# Patient Record
(demographics unavailable — no encounter records)

---

## 2024-10-29 NOTE — HISTORY OF PRESENT ILLNESS
[FreeTextEntry1] : Patient is here for follow up treatment for type 1 diabetes. Her prior laboratory tests show her A1c to be 7.2 was 6.6. Now 7.1..  Says she has been snacking a lot.  Is also concerned about weight gain. She is on Tandem pump with Dexcom sensor. and Control IQ which she is happy with. .  Last few mos. has been on multiple ABs for UTI S and has kidney stones. She also feels her menstrual cycle is off and gets it every 2 months.  Menses can be heavy.  Recently saw her ophthalmologist and was told everything was okay. [Continuous Glucose Monitoring] : Continuous Glucose Monitoring: Yes [Dexcom] : Dexcom [FreeTextEntry2] : 72 [FreeTextEntry3] : 24+3.3 [FreeTextEntry4] : 0.4 [de-identified] : 7.2 [FreeTextEntry5] : 161 [FreeTextEntry6] : 26

## 2024-10-29 NOTE — ASSESSMENT
[Diabetes Foot Care] : diabetes foot care [Long Term Vascular Complications] : long term vascular complications of diabetes [Carbohydrate Consistent Diet] : carbohydrate consistent diet [Importance of Diet and Exercise] : importance of diet and exercise to improve glycemic control, achieve weight loss and improve cardiovascular health [Hypoglycemia Management] : hypoglycemia management [Glucagon Use] : glucagon use [Action and use of Insulin] : action and use of short and long-acting insulin [Self Monitoring of Blood Glucose] : self monitoring of blood glucose [Retinopathy Screening] : Patient was referred to ophthalmology for retinopathy screening [Diabetic Medications] : Risks and benefits of diabetic medications were discussed [FreeTextEntry1] : Patient was doing well on her Tandem pump and Dexcom sensor with Control IQ. Had been much improved since had been eating less carbs. Last visit we did adjust her basal setting by lowering and change carb ratio to 8 throughout the day from 10 since having PP spikes. Most likely also underestimating CARBs will focus on this..  Last visit we did change CF from  30 to 20.   Knows to use activity mode at least 1 hour before exercise and has sleep mod which she loves. Probably increase estimate of carbs. Vit D better. Cholesterol has increased not sure why. may miss some doses. Consider see a cardiologist.  Since cholesterol elevated we will start Crestor 5 mg daily.. Started Ramipril 2..5 mg for higher BPs. Will take iron during week of menses for mild anemia.  f/u 3 months  Since concerned about weight gain we will do a trial of Mounjaro 2.5 mg weekly and will follow-up with the CDE in 1 month.  Will see if we need to adjust settings.

## 2024-10-29 NOTE — PHYSICAL EXAM
[Alert] : alert [Obese] : obese [No Acute Distress] : no acute distress [Normal Voice/Communication] : normal voice communication [Normal Sclera/Conjunctiva] : normal sclera/conjunctiva [Normal Outer Ear/Nose] : the ears and nose were normal in appearance [Thyroid Not Enlarged] : the thyroid was not enlarged [No Thyroid Nodules] : no palpable thyroid nodules [No Respiratory Distress] : no respiratory distress [Clear to Auscultation] : lungs were clear to auscultation bilaterally [Normal PMI] : the apical impulse was normal [Normal Rate] : heart rate was normal [Normal Bowel Sounds] : normal bowel sounds [Soft] : abdomen soft [No CVA Tenderness] : no ~M costovertebral angle tenderness [No Spinal Tenderness] : no spinal tenderness [Normal Gait] : normal gait [No Joint Swelling] : no joint swelling seen [No Rash] : no rash [Cranial Nerves Intact] : cranial nerves 2-12 were intact [Normal Reflexes] : deep tendon reflexes were 2+ and symmetric [No Tremors] : no tremors [Oriented x3] : oriented to person, place, and time [Normal Insight/Judgement] : insight and judgment were intact

## 2024-12-12 NOTE — HISTORY OF PRESENT ILLNESS
[FreeTextEntry1] : 47 year old female with Bilateral kidney stones here today after RIRS for kidney stones. Patient with bilateral duplicate collection system. The patient recently removed 3 DJ stents post/op. Referred to ER for flank pain. Residual stones in the upper kidney denisa bilaterally. On last check, mild hydro and regular creat. Comes to office for check.  Tobacco use: never smoker  Last Creatinine: 0.78 mg/dL (10/26/2024)  Last UCx: negative (11/03/2023) Uro meds: none  11/03/2023 - On US kidney the left kidney demonstrates mild hydronephrosis with a 10 mm echogenic focus in the lower pole. There is a 5.7 mm echogenic focus in the upper pole of the right kidney with distal shadowing. Both kidneys are normal in size and echogenicity without solid masses visualized  Blood draw for renal panel Collecting urine for UA and Culture  Will F/U in case of positive results.  07/04/2023 - CT Urogram IMPRESSION: 1. Bilateral renal collecting system duplication. Mildly obstructing stone in the renal pelvis of the upper pole moiety of the left kidney. Additional bilateral renal stones, as described. No evidence of ureteral or bladder stone. 2. Enhancing 8 mm gallbladder polyp. In the absence of prophylactic cholecystectomy, follow-up ultrasound in 6 months is recommended.  10/18/2023 - CT Renal Stone Hunt KIDNEYS/URETERS: Several nonobstructing right renal calculi in the upper pole and lower pole moieties again demonstrated without significant change measuring up to 3 mm. No right hydronephrosis. 10 x 5 mm stone in the renal pelvis of the left renal upper pole moiety with mild hydronephrosis without significant change. Small group of 2-3 mm stones within the left renal upper pole moiety is no longer present. The remaining multiple additional left renal stones are without significant change  11/03/2023 - Pathology Final Diagnosis 1.  Calculus, left kidney; removal: -   Fragments of calculus, gross examination -   Chemical analysis: 100% Calcium oxalate monohydrate. 2.  Calculus, right kidney; removal: -   Fragments of calculus, gross examination -   Chemical analysis: 90% Calcium oxalate monohydrate.  10% Calcium phosphate (apatite)

## 2024-12-12 NOTE — ASSESSMENT
[FreeTextEntry1] : 47 year old female with Bilateral kidney stones here today after RIRS for kidney stones. Patient with bilateral duplicate collection system. The patient recently removed 3 DJ stents post/op. Referred to ER for flank pain. Residual stones in the upper kidney denisa bilaterally. On last check, mild hydro and regular creat. Comes to office for check.  Tobacco use: never smoker  Last Creatinine: 0.78 mg/dL (10/26/2024)  Last UCx: negative (11/03/2023) Uro meds: none  11/03/2023 - On US kidney the left kidney demonstrates mild hydronephrosis with a 10 mm echogenic focus in the lower pole. There is a 5.7 mm echogenic focus in the upper pole of the right kidney with distal shadowing. Both kidneys are normal in size and echogenicity without solid masses visualized  Blood draw for renal panel Collecting urine for UA and Culture  Will F/U in case of positive results.  07/04/2023 - CT Urogram IMPRESSION: 1. Bilateral renal collecting system duplication. Mildly obstructing stone in the renal pelvis of the upper pole moiety of the left kidney. Additional bilateral renal stones, as described. No evidence of ureteral or bladder stone. 2. Enhancing 8 mm gallbladder polyp. In the absence of prophylactic cholecystectomy, follow-up ultrasound in 6 months is recommended.  10/18/2023 - CT Renal Stone Hunt KIDNEYS/URETERS: Several nonobstructing right renal calculi in the upper pole and lower pole moieties again demonstrated without significant change measuring up to 3 mm. No right hydronephrosis. 10 x 5 mm stone in the renal pelvis of the left renal upper pole moiety with mild hydronephrosis without significant change. Small group of 2-3 mm stones within the left renal upper pole moiety is no longer present. The remaining multiple additional left renal stones are without significant change  11/03/2023 - Pathology Final Diagnosis 1.  Calculus, left kidney; removal: -   Fragments of calculus, gross examination -   Chemical analysis: 100% Calcium oxalate monohydrate. 2.  Calculus, right kidney; removal: -   Fragments of calculus, gross examination -   Chemical analysis: 90% Calcium oxalate monohydrate.  10% Calcium phosphate (apatite)  Ordering new CT scan to check for stones. Planning UA and Culture (JACINTO on ABX) Blood draw for renal panel. Will F/U in case of positive results

## 2025-01-07 NOTE — ASSESSMENT
[FreeTextEntry1] : 47 year old female with Bilateral kidney stones here today after RIRS for kidney stones.  Patient with bilateral duplicate collection system. The patient recently removed 3 DJ stents post/op.  On last check, mild hydro and regular creat. Comes to office for planning based on last CT scan  Tobacco use: never smoker  Last Creatinine: 0.75 mg/dL (12/13/2024) Last UCx: negative  Uro meds: none  11/03/2023 - On US kidney the left kidney demonstrates mild hydronephrosis with a 10 mm echogenic focus in the lower pole. There is a 5.7 mm echogenic focus in the upper pole of the right kidney with distal shadowing. Both kidneys are normal in size and echogenicity without solid masses visualized  Blood draw for renal panel Collecting urine for UA and Culture  Will F/U in case of positive results.  07/04/2023 - CT Urogram IMPRESSION: 1. Bilateral renal collecting system duplication. Mildly obstructing stone in the renal pelvis of the upper pole moiety of the left kidney. Additional bilateral renal stones, as described. No evidence of ureteral or bladder stone. 2. Enhancing 8 mm gallbladder polyp. In the absence of prophylactic cholecystectomy, follow-up ultrasound in 6 months is recommended.  10/18/2023 - CT Renal Stone Hunt KIDNEYS/URETERS: Several nonobstructing right renal calculi in the upper pole and lower pole moieties again demonstrated without significant change measuring up to 3 mm. No right hydronephrosis. 10 x 5 mm stone in the renal pelvis of the left renal upper pole moiety with mild hydronephrosis without significant change. Small group of 2-3 mm stones within the left renal upper pole moiety is no longer present. The remaining multiple additional left renal stones are without significant change  11/03/2023 - Pathology Final Diagnosis 1.  Calculus, left kidney; removal: -   Fragments of calculus, gross examination -   Chemical analysis: 100% Calcium oxalate monohydrate. 2.  Calculus, right kidney; removal: -   Fragments of calculus, gross examination -   Chemical analysis: 90% Calcium oxalate monohydrate.  10% Calcium phosphate (apatite)  12/26/2024 - CT scan IMPRESSION: Mild hydroureteronephrosis of the left upper pole moiety to the level of a 8 mm mid ureteral stone. Additional sub-3 mm nonobstructing left lower pole renal stones.  Possible different treatments are explained, including SWL and URS. The patient agrees with the proposed left URS treatment, explanation provided with video summary. Possible complications are explained including bleeding, ureteral strictures. During the procedure right ureteroscopy will be also performed.  Patient booked for surgery  Collecting urine for UA and Culture

## 2025-01-07 NOTE — HISTORY OF PRESENT ILLNESS
[FreeTextEntry1] : 47 year old female with Bilateral kidney stones here today after RIRS for kidney stones.  Patient with bilateral duplicate collection system. The patient recently removed 3 DJ stents post/op.  On last check, mild hydro and regular creat. Comes to office for planning based on last CT scan  Tobacco use: never smoker  Last Creatinine: 0.75 mg/dL (12/13/2024) Last UCx: negative  Uro meds: none  11/03/2023 - On US kidney the left kidney demonstrates mild hydronephrosis with a 10 mm echogenic focus in the lower pole. There is a 5.7 mm echogenic focus in the upper pole of the right kidney with distal shadowing. Both kidneys are normal in size and echogenicity without solid masses visualized  Blood draw for renal panel Collecting urine for UA and Culture  Will F/U in case of positive results.  07/04/2023 - CT Urogram IMPRESSION: 1. Bilateral renal collecting system duplication. Mildly obstructing stone in the renal pelvis of the upper pole moiety of the left kidney. Additional bilateral renal stones, as described. No evidence of ureteral or bladder stone. 2. Enhancing 8 mm gallbladder polyp. In the absence of prophylactic cholecystectomy, follow-up ultrasound in 6 months is recommended.  10/18/2023 - CT Renal Stone Hunt KIDNEYS/URETERS: Several nonobstructing right renal calculi in the upper pole and lower pole moieties again demonstrated without significant change measuring up to 3 mm. No right hydronephrosis. 10 x 5 mm stone in the renal pelvis of the left renal upper pole moiety with mild hydronephrosis without significant change. Small group of 2-3 mm stones within the left renal upper pole moiety is no longer present. The remaining multiple additional left renal stones are without significant change  11/03/2023 - Pathology Final Diagnosis 1.  Calculus, left kidney; removal: -   Fragments of calculus, gross examination -   Chemical analysis: 100% Calcium oxalate monohydrate. 2.  Calculus, right kidney; removal: -   Fragments of calculus, gross examination -   Chemical analysis: 90% Calcium oxalate monohydrate.  10% Calcium phosphate (apatite)

## 2025-02-03 NOTE — THERAPY
[Humalog] : Humalog [_____] :  [unfilled] mg/dL [Insulin on Board (IOB) Duration = ____ hours] : Insulin on Board (IOB) Duration  = [unfilled] hours [de-identified] : Tandem

## 2025-02-03 NOTE — HISTORY OF PRESENT ILLNESS
[FreeTextEntry1] : Patient is here for follow up treatment for type 1 diabetes. Her prior laboratory tests show her A1c to be 7.2 was 6.6. Now 7.1..  Says she has been snacking a lot.  Is also concerned about weight gain. She is on Tandem pump with Dexcom sensor. and Control IQ which she is happy with. .  Last few mos. has been on multiple ABs for UTI S and has kidney stones. She also feels her menstrual cycle is off and gets it every 2 months.  Menses can be heavy.  Recently saw her ophthalmologist and was told everything was okay. 2 weeks ago she started 2.5 mg of Mounjaro is found her sugars significantly improved however after the first injection she did have diarrhea and some vomiting with the second injection there was just some nausea. [Continuous Glucose Monitoring] : Continuous Glucose Monitoring: Yes [Dexcom] : Dexcom [FreeTextEntry2] : 88 [FreeTextEntry3] : 8.4+3.3 [FreeTextEntry4] : 0.3 [de-identified] : 6.5 [FreeTextEntry5] : 135 [FreeTextEntry6] : 32

## 2025-02-03 NOTE — ASSESSMENT
[Diabetes Foot Care] : diabetes foot care [Long Term Vascular Complications] : long term vascular complications of diabetes [Carbohydrate Consistent Diet] : carbohydrate consistent diet [Importance of Diet and Exercise] : importance of diet and exercise to improve glycemic control, achieve weight loss and improve cardiovascular health [Exercise/Effect on Glucose] : exercise/effect on glucose [Hypoglycemia Management] : hypoglycemia management [Glucagon Use] : glucagon use [Action and use of Insulin] : action and use of short and long-acting insulin [Self Monitoring of Blood Glucose] : self monitoring of blood glucose [Retinopathy Screening] : Patient was referred to ophthalmology for retinopathy screening [FreeTextEntry1] : Patient was doing well on her Tandem pump and Dexcom sensor with Control IQ. Had been much improved since had been eating less carbs. Last visit we did adjust her basal setting by lowering and change carb ratio to 8 throughout the day from 10 since having PP spikes. Most likely also underestimating CARBs will focus on this..  Last visit we did change CF from  30 to 20.   Knows to use activity mode at least 1 hour before exercise and has sleep mod which she loves. Probably increase estimate of carbs. Vit D better. Cholesterol has increased not sure why. may miss some doses. Consider see a cardiologist.  Since cholesterol elevated we will start Crestor 5 mg daily.. Started Ramipril 2..5 mg for higher BPs. Will take iron during week of menses for mild anemia.  f/u 3 months  Since concerned about weight gain so 2 weeks ago she did start Mounjaro 2.5.  She finds her appetite is significantly decreased and is not requiring that much insulin at mealtime.  She will follow-up with diabetes educator in 1 month.  May need to adjust settings.

## 2025-02-26 NOTE — HISTORY OF PRESENT ILLNESS
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  I do not see any stones in the right kidney  Previous 24-hour urine showed good volumes, however, high sodium and high animal proteins She was instructed on low-salt diet and reducing animal protein intake Her previous kidney stones were predominantly calcium oxalate  Prior right ureteroscopy with incision of ureterocele and removal of stones in 2019 performed by me Also had bilateral ureteroscopy for stones in October 2023 performed by Dr. Dinero

## 2025-02-26 NOTE — ASSESSMENT
[FreeTextEntry1] : Reiterated dietary counseling from Dr. Perez regarding low-salt diet and reduced animal protein portions in her diet as well as maintaining good hydration daily  She will need left ureteroscopy laser lithotripsy for removal of stones in each moiety of the completely duplicated left collecting system.  LEFT ureteroscopy laser lithotripsy stents insertion x 2 in duplicated LEFT kidney. Risks, benefits and alternatives discussed. Risk of infection, multiple procedures, ureteral injury, ureteral stricture, incomplete stones clearance, sepsis, bleeding, retention, all discussed. Also discussed the possible need for a temporary ureteral stent. The possible side effects of a temporary ureteral stent, including flank pain, hematuria, and bladder spasms, bladder pressure, small volume voids, dysuria. The patient understands that a stent is a temporary implant that must be removed in the future.

## 2025-03-04 NOTE — REVIEW OF SYSTEMS
[Feeling Poorly] : not feeling poorly [Feeling Tired] : not feeling tired [Abdominal Pain] : no abdominal pain [Constipation] : constipation [Diarrhea] : no diarrhea [Bleeding] : no bleeding [Easy Bleeding] : no tendency for easy bleeding [Easy Bruising] : no tendency for easy bruising

## 2025-03-04 NOTE — ASSESSMENT
[FreeTextEntry1] : For Index CRC screening. no alarm features. Will schedule. Advised to hold Monjauro 2 weeks prior to assist with prep evacuation. Follow up PRN.

## 2025-03-04 NOTE — HISTORY OF PRESENT ILLNESS
----- Message from SHAYY Lehman NP sent at 3/3/2025  8:03 AM EST -----  VIT d remains low, vit b 12 is normal . Cholesterol is elevated with LDL of 129 I think we started you on crestor will see how that works   [FreeTextEntry1] : Ref MD: Consuelo  47F DM type 1 on insulin pump, HTN, HL, here to discuss index screening colonoscopy.   Before Monjauro - was 1-2 times per week.  Now on Monjauro - rare.  Does mag citrate, miralax, stool softener to help with bm.  No bleeding.  No abdominal pain.   PMHx HTN, HL, DM type 1 on insulin pump, obesity PSHx , kidney stone removals All NKDA Meds insulin pump, Monjauro, ramipril, rosuvastatin SHx nonsmoker, no ETOH,  FHx neg for CRC

## 2025-04-01 NOTE — THERAPY
[Humalog] : Humalog [_____] :  [unfilled] mg/dL [Insulin on Board (IOB) Duration = ____ hours] : Insulin on Board (IOB) Duration  = [unfilled] hours [de-identified] : Tandem

## 2025-04-01 NOTE — ASSESSMENT
[Diabetes Foot Care] : diabetes foot care [Long Term Vascular Complications] : long term vascular complications of diabetes [Carbohydrate Consistent Diet] : carbohydrate consistent diet [Importance of Diet and Exercise] : importance of diet and exercise to improve glycemic control, achieve weight loss and improve cardiovascular health [Hypoglycemia Management] : hypoglycemia management [Glucagon Use] : glucagon use [Self Monitoring of Blood Glucose] : self monitoring of blood glucose [Retinopathy Screening] : Patient was referred to ophthalmology for retinopathy screening [Diabetic Medications] : Risks and benefits of diabetic medications were discussed [FreeTextEntry1] : Patient was doing well on her Tandem pump and Dexcom sensor with Control IQ. Had been much improved since had been eating less carbs. Last visit we did adjust her basal setting by lowering and change carb ratio to 8 throughout the day from 10 since having PP spikes. Most likely also underestimating CARBs will focus on this..  Last visit we did change CF from  30 to 20.   Knows to use activity mode at least 1 hour before exercise and has sleep mod which she loves. Probably increase estimate of carbs. Vit D better. Cholesterol has increased not sure why. may miss some doses. Consider see a cardiologist.  Since cholesterol elevated we will start Crestor 5 mg daily.. Started Ramipril 2..5 mg for higher BPs. Will take iron during week of menses for mild anemia.  f/u 3 months  Since concerned about weight gain so 2-3  mos ago she did start Mounjaro 2.5.  She finds her appetite is significantly decreased and is not requiring that much insulin at mealtime.  She has lost over 10 pounds and is happier on this medication.   Prior to surgery she will hold the Mounjaro for 2 weeks and the morning of surgery she will change her settings to exercise mode which changes her target to 150 to prevent hypoglycemia.  Once she is home and eating she can resume her normal settings.  Doing well and is medically cleared by endocrine for surgery.

## 2025-04-01 NOTE — HISTORY OF PRESENT ILLNESS
[FreeTextEntry1] : Patient is here for follow up treatment for type 1 diabetes. Her prior laboratory tests show her A1c to be 7.2 was 6.6. Now 7.1..  Says she has been snacking a lot.  Is also concerned about weight gain. She is on Tandem pump with Dexcom sensor. and Control IQ which she is happy with. .  Last few mos. has been on multiple ABs for UTI S and has kidney stones. She also feels her menstrual cycle is off and gets it every 2 months.  Menses can be heavy.  Recently saw her ophthalmologist and was told everything was okay. 2 mos ago she started 2.5 mg of Mounjaro is found her sugars significantly improved and has lost some weight. Planning to have surgery on kidney stones in a few weeks. [Continuous Glucose Monitoring] : Continuous Glucose Monitoring: Yes [Dexcom] : Dexcom [FreeTextEntry2] : 95 [FreeTextEntry3] : 3.3 [FreeTextEntry4] : 1.9 [de-identified] : 6.2 [FreeTextEntry5] : 120 [FreeTextEntry6] : 24

## 2025-07-08 NOTE — ASSESSMENT
[Diabetes Foot Care] : diabetes foot care [Long Term Vascular Complications] : long term vascular complications of diabetes [Carbohydrate Consistent Diet] : carbohydrate consistent diet [Importance of Diet and Exercise] : importance of diet and exercise to improve glycemic control, achieve weight loss and improve cardiovascular health [Hypoglycemia Management] : hypoglycemia management [Glucagon Use] : glucagon use [Self Monitoring of Blood Glucose] : self monitoring of blood glucose [Retinopathy Screening] : Patient was referred to ophthalmology for retinopathy screening [Diabetic Medications] : Risks and benefits of diabetic medications were discussed [FreeTextEntry1] : Patient was doing well on her Tandem pump and Dexcom sensor with Control IQ. Had been much improved since had been eating less carbs. Last visit we did adjust her basal setting by lowering and change carb ratio to 8 throughout the day from 10 since having PP spikes. Most likely also underestimating CARBs will focus on this..  Last year we changed her correction factor from 30-20 but now that she is on Mounjaro she is having more significant drops after her postprandial spikes so we will change the correction factor to 25.  Knows to use activity mode at least 1 hour before exercise and has sleep mod which she loves. Probably increase estimate of carbs. Vit D better. Cholesterol has increased not sure why. may miss some doses. Consider see a cardiologist.  Since cholesterol elevated we will start Crestor 5 mg daily.. Started Ramipril 2..5 mg for higher BPs. Will take iron during week of menses for mild anemia.  f/u 3 months

## 2025-07-08 NOTE — HISTORY OF PRESENT ILLNESS
[FreeTextEntry1] : Patient is here for follow up treatment for type 1 diabetes. Her prior laboratory tests show her A1c to be 7.2 was 6.6. Now 7.1..  Says she has been snacking a lot.  Is also concerned about weight gain.  Has been doing well since she started Mounjaro 2.5 mg. She is on Tandem pump with Dexcom sensor. and Control IQ which she is happy with. .  Last few mos. has been on multiple ABs for UTI S and has kidney stones. She also feels her menstrual cycle is off and gets it every 2 months.  Menses can be heavy.  Recently saw her ophthalmologist and was told everything was okay.  [Continuous Glucose Monitoring] : Continuous Glucose Monitoring: Yes [Dexcom] : Dexcom [FreeTextEntry2] : 79 [FreeTextEntry3] : 15+1.2 [FreeTextEntry4] : 3.3 [de-identified] : 6.6 [FreeTextEntry5] : 1 36 [FreeTextEntry6] : 33

## 2025-07-08 NOTE — THERAPY
[Humalog] : Humalog [_____] :  [unfilled] mg/dL [Insulin on Board (IOB) Duration = ____ hours] : Insulin on Board (IOB) Duration  = [unfilled] hours [de-identified] : Tandem